# Patient Record
Sex: MALE | URBAN - METROPOLITAN AREA
[De-identification: names, ages, dates, MRNs, and addresses within clinical notes are randomized per-mention and may not be internally consistent; named-entity substitution may affect disease eponyms.]

---

## 2017-06-28 ENCOUNTER — IMPORTED ENCOUNTER (OUTPATIENT)
Dept: URBAN - METROPOLITAN AREA CLINIC 38 | Facility: CLINIC | Age: 72
End: 2017-06-28

## 2017-06-28 PROBLEM — H25.813 COMBINED FORMS OF AGE-RELATED CATARACT, BILATERAL: Noted: 2017-06-28

## 2017-06-28 PROBLEM — H52.4 PRESBYOPIA: Noted: 2017-06-28

## 2017-06-28 PROCEDURE — 92014 COMPRE OPH EXAM EST PT 1/>: CPT

## 2017-06-28 PROCEDURE — 92015 DETERMINE REFRACTIVE STATE: CPT

## 2017-07-17 NOTE — PATIENT DISCUSSION
(H33.904) Vitreous degeneration, bilateral - Assesment : Examination revealed PVD OU. - Plan : Monitor for changes. Advised patient to call our office with decreased vision or an increase in flashes and/or floaters.

## 2017-07-17 NOTE — PATIENT DISCUSSION
(H35.372) Puckering of macula, left eye - Assesment : Examination revealed ERM 1+ OS. - Plan : Monitor. Advised patient to call our office with decreased vision or increased symptoms. RV 1 year Exam/MAC OCT.

## 2017-07-19 ENCOUNTER — IMPORTED ENCOUNTER (OUTPATIENT)
Dept: URBAN - METROPOLITAN AREA CLINIC 38 | Facility: CLINIC | Age: 72
End: 2017-07-19

## 2017-07-19 PROBLEM — H52.4 PRESBYOPIA: Noted: 2017-07-19

## 2018-07-20 NOTE — PATIENT DISCUSSION
(H35.372) Puckering of macula, left eye - Assesment : Examination revealed ERM 1+ OS. Stable by OCT mac today. - Plan : Monitor. Advised patient to call our office with decreased vision or increased symptoms.   RTC 1 year/EXAM

## 2018-07-20 NOTE — PATIENT DISCUSSION
(H35.342) Macular cyst, hole, or pseudohole, left eye - Assesment : Examination revealed a macular hole OS. Stable - Plan : Monitor for change in size and shape.

## 2018-10-24 ENCOUNTER — IMPORTED ENCOUNTER (OUTPATIENT)
Dept: URBAN - METROPOLITAN AREA CLINIC 38 | Facility: CLINIC | Age: 73
End: 2018-10-24

## 2018-10-24 PROBLEM — H52.03 HYPEROPIA -     BOTH EYES: Noted: 2018-10-24

## 2018-10-24 PROBLEM — H25.813 COMBINED FORMS OF AGE-RELATED CATARACT, BILATERAL: Noted: 2018-10-24

## 2018-10-24 PROCEDURE — 92014 COMPRE OPH EXAM EST PT 1/>: CPT

## 2018-10-24 PROCEDURE — 92015 DETERMINE REFRACTIVE STATE: CPT

## 2019-09-20 ENCOUNTER — TELEPHONE (OUTPATIENT)
Dept: COLORECTAL SURGERY | Facility: CLINIC | Age: 74
End: 2019-09-20

## 2019-09-20 NOTE — TELEPHONE ENCOUNTER
Patient called requesting a recommendation for a Colorectal Surgeon in [place].      Patient informed, Dr. Vick' recommended Dr. Cleopatra Castillo who is located in [place].

## 2021-01-11 ENCOUNTER — NEW PATIENT COMPREHENSIVE (OUTPATIENT)
Dept: URBAN - METROPOLITAN AREA CLINIC 43 | Facility: CLINIC | Age: 76
End: 2021-01-11

## 2021-01-11 DIAGNOSIS — H25.9: ICD-10-CM

## 2021-01-11 DIAGNOSIS — H43.813: ICD-10-CM

## 2021-01-11 DIAGNOSIS — H52.03: ICD-10-CM

## 2021-01-11 PROCEDURE — 92015 DETERMINE REFRACTIVE STATE: CPT

## 2021-01-11 PROCEDURE — 92310-1 LEVEL 1 CONTACT LENS MANAGEMENT

## 2021-01-11 PROCEDURE — 92004 COMPRE OPH EXAM NEW PT 1/>: CPT

## 2021-01-11 ASSESSMENT — TONOMETRY
OD_IOP_MMHG: 10
OS_IOP_MMHG: 10

## 2021-01-11 ASSESSMENT — VISUAL ACUITY
OD_SC: J12
OS_CC: J2
OD_SC: 20/60
OS_SC: 20/30-1
OD_CC: J3
OD_CC: 20/25-2
OS_SC: J12
OS_CC: 20/30+1

## 2022-01-11 ENCOUNTER — COMPREHENSIVE EXAM (OUTPATIENT)
Dept: URBAN - METROPOLITAN AREA CLINIC 43 | Facility: CLINIC | Age: 77
End: 2022-01-11

## 2022-01-11 DIAGNOSIS — H43.813: ICD-10-CM

## 2022-01-11 DIAGNOSIS — H25.9: ICD-10-CM

## 2022-01-11 DIAGNOSIS — H52.03: ICD-10-CM

## 2022-01-11 PROCEDURE — 92014 COMPRE OPH EXAM EST PT 1/>: CPT

## 2022-01-11 PROCEDURE — 92310-1 LEVEL 1 CONTACT LENS MANAGEMENT

## 2022-01-11 PROCEDURE — 92015 DETERMINE REFRACTIVE STATE: CPT

## 2022-01-11 ASSESSMENT — KERATOMETRY
OD_K2POWER_DIOPTERS: 43.75
OD_K1POWER_DIOPTERS: 43.50
OS_AXISANGLE2_DEGREES: 160
OS_K1POWER_DIOPTERS: 44.25
OD_AXISANGLE_DEGREES: 155
OS_K2POWER_DIOPTERS: 44.50
OD_AXISANGLE2_DEGREES: 65
OS_AXISANGLE_DEGREES: 70

## 2022-01-11 ASSESSMENT — VISUAL ACUITY
OS_BAT: 20/40
OS_SC: J12
OS_PH: 20/30+2
OD_SC: J12
OU_SC: 20/25-2
OS_CC: J8 CL
OS_SC: 20/40
OD_CC: 20/25 CL
OU_CC: J6
OD_BAT: 20/40
OD_SC: 20/50+2
OD_CC: J10 CL
OU_CC: 20/25-2

## 2022-01-11 ASSESSMENT — TONOMETRY
OD_IOP_MMHG: 12
OS_IOP_MMHG: 12

## 2022-06-18 ASSESSMENT — KERATOMETRY
OD_K2POWER_DIOPTERS: 43.50
OD_AXISANGLE_DEGREES: 180
OS_K2POWER_DIOPTERS: 44.00
OD_K1POWER_DIOPTERS: 43.50
OS_AXISANGLE2_DEGREES: 70
OD_AXISANGLE2_DEGREES: 90
OS_K1POWER_DIOPTERS: 44.25
OS_AXISANGLE_DEGREES: 160

## 2022-06-18 ASSESSMENT — VISUAL ACUITY
OU_CC: 5
OD_BAT: 20/60
OD_CC: 20/20
OS_BAT: 20/100
OU_CC: 20/20
OU_CC: 20/25
OU_CC: J3
OS_CC: 20/25
OD_CC: 20/30-
OS_CC: 20/25-

## 2022-06-18 ASSESSMENT — TONOMETRY
OD_IOP_MMHG: 14
OD_IOP_MMHG: 11
OS_IOP_MMHG: 11
OS_IOP_MMHG: 10

## 2023-01-12 ENCOUNTER — COMPREHENSIVE EXAM (OUTPATIENT)
Dept: URBAN - METROPOLITAN AREA CLINIC 43 | Facility: CLINIC | Age: 78
End: 2023-01-12

## 2023-01-12 DIAGNOSIS — H52.03: ICD-10-CM

## 2023-01-12 DIAGNOSIS — H25.9: ICD-10-CM

## 2023-01-12 DIAGNOSIS — H43.813: ICD-10-CM

## 2023-01-12 PROCEDURE — 92015 DETERMINE REFRACTIVE STATE: CPT

## 2023-01-12 PROCEDURE — 92014 COMPRE OPH EXAM EST PT 1/>: CPT

## 2023-01-12 ASSESSMENT — VISUAL ACUITY
OU_CC: 20/40+1
OS_SC: >J12
OD_SC: 20/70+2
OS_CC: J1
OD_CC: J2
OD_BAT: 20/40
OS_CC: 20/40+1
OS_BAT: 20/40
OU_SC: 20/40
OD_CC: 20/40+2
OS_SC: 20/50
OD_SC: >J12
OU_CC: J1

## 2023-01-12 ASSESSMENT — KERATOMETRY
OD_AXISANGLE2_DEGREES: 65
OD_K2POWER_DIOPTERS: 43.75
OS_AXISANGLE2_DEGREES: 160
OS_K2POWER_DIOPTERS: 44.50
OS_K1POWER_DIOPTERS: 44.25
OS_AXISANGLE_DEGREES: 70
OD_AXISANGLE_DEGREES: 155
OD_K1POWER_DIOPTERS: 43.50

## 2023-01-12 ASSESSMENT — TONOMETRY
OS_IOP_MMHG: 13
OD_IOP_MMHG: 13

## 2023-07-27 ENCOUNTER — COMPREHENSIVE EXAM (OUTPATIENT)
Dept: URBAN - METROPOLITAN AREA CLINIC 43 | Facility: CLINIC | Age: 78
End: 2023-07-27

## 2023-07-27 DIAGNOSIS — H52.4: ICD-10-CM

## 2023-07-27 PROCEDURE — 92014 COMPRE OPH EXAM EST PT 1/>: CPT

## 2023-07-27 PROCEDURE — 92015 DETERMINE REFRACTIVE STATE: CPT

## 2023-07-27 ASSESSMENT — TONOMETRY
OS_IOP_MMHG: 10
OD_IOP_MMHG: 10

## 2023-07-27 ASSESSMENT — VISUAL ACUITY
OD_CC: J2
OS_SC: 20/20-2
OS_SC: J6
OU_SC: J4
OS_CC: J1
OD_SC: 20/30+1
OU_SC: 20/20
OU_CC: J1
OD_SC: J6

## 2025-04-24 ENCOUNTER — COMPREHENSIVE EXAM (OUTPATIENT)
Age: 80
End: 2025-04-24

## 2025-04-24 DIAGNOSIS — H43.813: ICD-10-CM

## 2025-04-24 DIAGNOSIS — H04.123: ICD-10-CM

## 2025-04-24 DIAGNOSIS — H52.4: ICD-10-CM

## 2025-04-24 DIAGNOSIS — H26.493: ICD-10-CM

## 2025-04-24 PROCEDURE — 92014 COMPRE OPH EXAM EST PT 1/>: CPT

## 2025-04-24 PROCEDURE — 92015 DETERMINE REFRACTIVE STATE: CPT
